# Patient Record
Sex: MALE | Race: WHITE | NOT HISPANIC OR LATINO | Employment: FULL TIME | URBAN - METROPOLITAN AREA
[De-identification: names, ages, dates, MRNs, and addresses within clinical notes are randomized per-mention and may not be internally consistent; named-entity substitution may affect disease eponyms.]

---

## 2019-10-17 ENCOUNTER — ALLIED HEALTH/NURSE VISIT (OUTPATIENT)
Dept: NURSING | Facility: CLINIC | Age: 49
End: 2019-10-17
Payer: COMMERCIAL

## 2019-10-17 DIAGNOSIS — Z23 NEED FOR PROPHYLACTIC VACCINATION AND INOCULATION AGAINST INFLUENZA: Primary | ICD-10-CM

## 2019-10-17 PROCEDURE — 90686 IIV4 VACC NO PRSV 0.5 ML IM: CPT

## 2019-10-17 PROCEDURE — 90471 IMMUNIZATION ADMIN: CPT

## 2020-02-11 ENCOUNTER — OFFICE VISIT (OUTPATIENT)
Dept: FAMILY MEDICINE | Facility: CLINIC | Age: 50
End: 2020-02-11
Payer: COMMERCIAL

## 2020-02-11 VITALS
WEIGHT: 155.2 LBS | HEART RATE: 69 BPM | HEIGHT: 70 IN | BODY MASS INDEX: 22.22 KG/M2 | RESPIRATION RATE: 16 BRPM | TEMPERATURE: 98.3 F | SYSTOLIC BLOOD PRESSURE: 110 MMHG | OXYGEN SATURATION: 97 % | DIASTOLIC BLOOD PRESSURE: 61 MMHG

## 2020-02-11 DIAGNOSIS — Z00.00 WELL ADULT HEALTH CHECK: Primary | ICD-10-CM

## 2020-02-11 DIAGNOSIS — N52.9 ERECTILE DYSFUNCTION, UNSPECIFIED ERECTILE DYSFUNCTION TYPE: ICD-10-CM

## 2020-02-11 PROCEDURE — 99213 OFFICE O/P EST LOW 20 MIN: CPT | Mod: 25 | Performed by: NURSE PRACTITIONER

## 2020-02-11 PROCEDURE — 99396 PREV VISIT EST AGE 40-64: CPT | Performed by: NURSE PRACTITIONER

## 2020-02-11 RX ORDER — SILDENAFIL 25 MG/1
25 TABLET, FILM COATED ORAL DAILY PRN
Qty: 30 TABLET | Refills: 3 | Status: SHIPPED | OUTPATIENT
Start: 2020-02-11 | End: 2021-01-24

## 2020-02-11 ASSESSMENT — ENCOUNTER SYMPTOMS
DIARRHEA: 0
ABDOMINAL PAIN: 0
NERVOUS/ANXIOUS: 0
MYALGIAS: 0
WEAKNESS: 0
HEADACHES: 0
SHORTNESS OF BREATH: 0
CONSTIPATION: 0
NAUSEA: 0
PALPITATIONS: 0
HEARTBURN: 0
PARESTHESIAS: 0
EYE PAIN: 0
ARTHRALGIAS: 0
HEMATOCHEZIA: 0
FREQUENCY: 0
CHILLS: 0
DYSURIA: 0
DIZZINESS: 0
JOINT SWELLING: 0
COUGH: 0
SORE THROAT: 0
HEMATURIA: 0
FEVER: 0

## 2020-02-11 ASSESSMENT — MIFFLIN-ST. JEOR: SCORE: 1579.2

## 2020-02-11 NOTE — PROGRESS NOTES
SUBJECTIVE:   CC: Laurent Lepe is an 49 year old male who presents for preventative health visit.     Healthy Habits:     Getting at least 3 servings of Calcium per day:  NO    Bi-annual eye exam:  Yes    Dental care twice a year:  Yes    Sleep apnea or symptoms of sleep apnea:  None    Diet:  Regular (no restrictions)    Frequency of exercise:  4-5 days/week    Duration of exercise:  30-45 minutes    Taking medications regularly:  Not Applicable    Medication side effects:  Not applicable    PHQ-2 Total Score: 0    Additional concerns today:  No    ED -  Trouble getting erect and maintaining erection  Minimal alcohol  No other drugs  Would like to try medications.    Never taken anything for this in the past.       Today's PHQ-2 Score:   PHQ-2 ( 1999 Pf[employer]) 2/11/2020   Q1: Little interest or pleasure in doing things 0   Q2: Feeling down, depressed or hopeless 0   PHQ-2 Score 0   Q1: Little interest or pleasure in doing things Not at all   Q2: Feeling down, depressed or hopeless Not at all   PHQ-2 Score 0       Abuse: Current or Past(Physical, Sexual or Emotional)- No  Do you feel safe in your environment? Yes        Social History     Tobacco Use     Smoking status: Never Smoker     Smokeless tobacco: Never Used   Substance Use Topics     Alcohol use: Yes     Comment: 2-3 drinks per week     If you drink alcohol do you typically have >3 drinks per day or >7 drinks per week? No    Alcohol Use 2/11/2020   Prescreen: >3 drinks/day or >7 drinks/week? No   Prescreen: >3 drinks/day or >7 drinks/week? -     Last PSA: No results found for: PSA    Reviewed orders with patient. Reviewed health maintenance and updated orders accordingly - Yes    Reviewed and updated as needed this visit by clinical staff  Tobacco  Allergies  Meds  Problems  Med Hx  Surg Hx  Fam Hx  Soc Hx          Reviewed and updated as needed this visit by Provider  Tobacco  Allergies  Meds  Problems  Med Hx  Surg Hx  Fam Hx          Review of  "Systems   Constitutional: Negative for chills and fever.   HENT: Negative for congestion, ear pain, hearing loss and sore throat.    Eyes: Negative for pain and visual disturbance.   Respiratory: Negative for cough and shortness of breath.    Cardiovascular: Negative for chest pain, palpitations and peripheral edema.   Gastrointestinal: Negative for abdominal pain, constipation, diarrhea, heartburn, hematochezia and nausea.   Genitourinary: Positive for impotence. Negative for discharge, dysuria, frequency, hematuria and urgency.   Musculoskeletal: Negative for arthralgias, joint swelling and myalgias.   Skin: Negative for rash.   Neurological: Negative for dizziness, weakness, headaches and paresthesias.   Psychiatric/Behavioral: Negative for mood changes. The patient is not nervous/anxious.        OBJECTIVE:   /61   Pulse 69   Temp 98.3  F (36.8  C)   Resp 16   Ht 1.784 m (5' 10.25\")   Wt 70.4 kg (155 lb 3.2 oz)   SpO2 97%   BMI 22.11 kg/m      Physical Exam  GENERAL: healthy, alert and no distress  EYES: Eyes grossly normal to inspection, PERRL and conjunctivae and sclerae normal  HENT: ear canals and TM's normal, nose and mouth without ulcers or lesions  NECK: no adenopathy, no asymmetry, masses, or scars and thyroid normal to palpation  RESP: lungs clear to auscultation - no rales, rhonchi or wheezes  CV: regular rate and rhythm, normal S1 S2, no S3 or S4, no murmur, click or rub, no peripheral edema and peripheral pulses strong  ABDOMEN: soft, nontender, no hepatosplenomegaly, no masses and bowel sounds normal  MS: no gross musculoskeletal defects noted, no edema  SKIN: no suspicious lesions or rashes  PSYCH: mentation appears normal, affect normal/bright      ASSESSMENT/PLAN:       ICD-10-CM    1. Well adult health check Z00.00    2. Erectile dysfunction, unspecified erectile dysfunction type N52.9 sildenafil (VIAGRA) 25 MG tablet     OFFICE/OUTPT VISIT,EST,LEVL III      well male, not fasting " "for labs.  Encouraged to continue exercise and healthy diet choices.      Discussed the nature and pathophysiology of erectile dysfunction, that this is primarily physiologic and incidence is increased with age and any underlying vascular disease. Went over the use of Viagra and similar drugs for treating this disorder.  Discussed risks, benefits, side effects, and alternatives of therapy. Recheck if therapy is not effective or if side effects preclude its use.    COUNSELING:   Reviewed preventive health counseling, as reflected in patient instructions    Estimated body mass index is 22.11 kg/m  as calculated from the following:    Height as of this encounter: 1.784 m (5' 10.25\").    Weight as of this encounter: 70.4 kg (155 lb 3.2 oz).          reports that he has never smoked. He has never used smokeless tobacco.      Counseling Resources:  ATP IV Guidelines  Pooled Cohorts Equation Calculator  FRAX Risk Assessment  ICSI Preventive Guidelines  Dietary Guidelines for Americans, 2010  USDA's MyPlate  ASA Prophylaxis  Lung CA Screening    MOLLY James CNP  [facility]  "

## 2021-01-23 DIAGNOSIS — N52.9 ERECTILE DYSFUNCTION, UNSPECIFIED ERECTILE DYSFUNCTION TYPE: ICD-10-CM

## 2021-01-24 RX ORDER — SILDENAFIL 25 MG/1
25 TABLET, FILM COATED ORAL DAILY PRN
Qty: 30 TABLET | Refills: 0 | Status: SHIPPED | OUTPATIENT
Start: 2021-01-24 | End: 2021-06-28

## 2021-01-24 NOTE — TELEPHONE ENCOUNTER
---Prescription approved per [facility] Protocol.        Sanam Joyner RN BSN      [facility]          --Last visit:  2/11/2020

## 2021-06-25 DIAGNOSIS — N52.9 ERECTILE DYSFUNCTION, UNSPECIFIED ERECTILE DYSFUNCTION TYPE: ICD-10-CM

## 2021-06-28 RX ORDER — SILDENAFIL 25 MG/1
TABLET, FILM COATED ORAL
Qty: 30 TABLET | Refills: 0 | Status: SHIPPED | OUTPATIENT
Start: 2021-06-28

## 2021-12-22 DIAGNOSIS — N52.9 ERECTILE DYSFUNCTION, UNSPECIFIED ERECTILE DYSFUNCTION TYPE: ICD-10-CM

## 2021-12-23 RX ORDER — SILDENAFIL 25 MG/1
TABLET, FILM COATED ORAL
Qty: 30 TABLET | Refills: 0 | OUTPATIENT
Start: 2021-12-23

## 2021-12-23 NOTE — TELEPHONE ENCOUNTER
Routing refill request to provider for review/approval because:  Patient needs to be seen because it has been more than 1 year since last office visit.    Last seen by EARL Whittington: 2/2020    Team Coordinators: Please contact patient to set up annual physical for any further refills.     JULIENNE MorrisonN RN  [facility]

## 2022-11-14 ENCOUNTER — TELEPHONE (OUTPATIENT)
Dept: CONSULT | Facility: CLINIC | Age: 52
End: 2022-11-14

## 2023-10-21 ENCOUNTER — OFFICE VISIT (OUTPATIENT)
Dept: URGENT CARE | Facility: URGENT CARE | Age: 53
End: 2023-10-21
Payer: COMMERCIAL

## 2023-10-21 VITALS
HEART RATE: 77 BPM | DIASTOLIC BLOOD PRESSURE: 80 MMHG | SYSTOLIC BLOOD PRESSURE: 126 MMHG | WEIGHT: 162 LBS | OXYGEN SATURATION: 99 % | BODY MASS INDEX: 23.19 KG/M2 | HEIGHT: 70 IN

## 2023-10-21 DIAGNOSIS — S61.219A LACERATION OF FINGER OF RIGHT HAND WITHOUT FOREIGN BODY, NAIL DAMAGE STATUS UNSPECIFIED, UNSPECIFIED FINGER, INITIAL ENCOUNTER: ICD-10-CM

## 2023-10-21 DIAGNOSIS — S61.214A LACERATION OF RIGHT RING FINGER WITHOUT DAMAGE TO NAIL, FOREIGN BODY PRESENCE UNSPECIFIED, INITIAL ENCOUNTER: Primary | ICD-10-CM

## 2023-10-21 PROCEDURE — 12002 RPR S/N/AX/GEN/TRNK2.6-7.5CM: CPT | Mod: F7 | Performed by: FAMILY MEDICINE

## 2023-10-21 NOTE — PROGRESS NOTES
Subjective: Patient was working on his mower blade this morning when it slipped and he cut his right middle ring and fifth finger.  Tetanus shot is up-to-date    Objective: There are 3 lacerations, 1 on each finger.  The middle finger is more of a small divot removed but I was able to bring the 2 edges together after local Xylocaine without epinephrine and 1 stitch.  The fifth finger has a C-shaped laceration that I sutured with three 5-0 Ethilon sutures.  The ring finger has a more irregular flap that I was able to pin down, appears to have good viability, with five 5-0 Ethilon sutures.  We put on a pressure bandage.    Assessment and plan: Laceration repair 3 separate fingers, fifth finger about 2 and half centimeters, middle finger about 1 cm, and 4 cm for the ring finger.  Sutures out in 7 or 8 days, keep it clean, minimize flexion.

## 2023-10-29 ENCOUNTER — ANCILLARY PROCEDURE (OUTPATIENT)
Dept: GENERAL RADIOLOGY | Facility: CLINIC | Age: 53
End: 2023-10-29
Attending: PHYSICIAN ASSISTANT
Payer: COMMERCIAL

## 2023-10-29 ENCOUNTER — OFFICE VISIT (OUTPATIENT)
Dept: URGENT CARE | Facility: URGENT CARE | Age: 53
End: 2023-10-29
Payer: COMMERCIAL

## 2023-10-29 VITALS
RESPIRATION RATE: 16 BRPM | HEIGHT: 70 IN | TEMPERATURE: 98 F | BODY MASS INDEX: 23.19 KG/M2 | WEIGHT: 162 LBS | HEART RATE: 60 BPM | SYSTOLIC BLOOD PRESSURE: 124 MMHG | DIASTOLIC BLOOD PRESSURE: 80 MMHG

## 2023-10-29 DIAGNOSIS — M79.89 FINGER SWELLING: ICD-10-CM

## 2023-10-29 DIAGNOSIS — L08.9 FINGER INFECTION: Primary | ICD-10-CM

## 2023-10-29 PROCEDURE — 73140 X-RAY EXAM OF FINGER(S): CPT | Mod: TC | Performed by: RADIOLOGY

## 2023-10-29 PROCEDURE — 99214 OFFICE O/P EST MOD 30 MIN: CPT | Performed by: PHYSICIAN ASSISTANT

## 2023-10-29 NOTE — PROGRESS NOTES
"Chief Complaint   Patient presents with    Urgent Care    Musculoskeletal Problem    Laceration     Here last Saturday for laceration on right 3-5 fingers, here today for removal, having a lot of pain in 4th finger and can't make a fist.       ASSESSMENT/PLAN:  Laurent was seen today for urgent care, musculoskeletal problem and laceration.    Diagnoses and all orders for this visit:    Finger infection  -     amoxicillin-clavulanate (AUGMENTIN) 875-125 MG tablet; Take 1 tablet by mouth 2 times daily for 7 days  -     Orthopedic  Referral; Future    Finger swelling  -     XR Finger Right G/E 2 Views; Future  -     Orthopedic  Referral; Future    Infected lacerations of the pinky and ring finger on the right hand.  Do have some concern for tenosynovitis given the pain with passive extension, whole finger swelling and no evidence of fracture or osteomyelitis.  Because he did bite some tissue off after initially happening I feel it appropriate to cover for oral pathogens and Augmentin should be a good choice.  I would like him to be closely followed by hand specialty to make sure he is improving and to further evaluate for tenosynovitis.  Referral placed  Sutures were removed.    Gonzalo Dubois PA-C      SUBJECTIVE:  Laurent is a 53 year old male who presents to urgent care for suture removal after he had lacerations to his pinky, ring and middle finger 8 days ago.  They were placed here.  Since then he has still had some pain and swelling in the areas.  No discharge.  He does state that after it initially happened he did bite some of the skin off of the wound before coming in to be seen.    ROS: Pertinent ROS neg other than the symptoms noted above in the HPI.     OBJECTIVE:  /80   Pulse 60   Temp 98  F (36.7  C) (Temporal)   Resp 16   Ht 1.778 m (5' 10\")   Wt 73.5 kg (162 lb)   BMI 23.24 kg/m     GENERAL: healthy, alert and no distress  MS: See below  SKIN: Laceration over the IP joint of the " pinky, small amount of dehiscence and pus expressed with flexion, tender.  Full range of motion.  Ring finger: Swelling of the entire finger, keeps in slight flexion, pain with extension and flexion passively.  Diffuse tenderness, healing wound with some macerated tissue, middle finger well-healing scab without any erythema or tenderness    DIAGNOSTICS  Xray - Reviewed and interpreted by me.  No acute bony abnormality or fracture, no indications of osteomyelitis  No results found for any visits on 10/29/23.     Current Outpatient Medications   Medication    sildenafil (VIAGRA) 25 MG tablet     No current facility-administered medications for this visit.      Patient Active Problem List   Diagnosis    CARDIOVASCULAR SCREENING; LDL GOAL LESS THAN 160    Male pattern alopecia      Past Medical History:   Diagnosis Date    Allergic rhinitis, cause unspecified     Allergic rhinitis     Past Surgical History:   Procedure Laterality Date    NO HISTORY OF SURGERY       Family History   Problem Relation Age of Onset    C.A.D. Maternal Grandfather     Hypertension Maternal Grandfather     Asthma Paternal Grandfather     Hypertension Paternal Grandfather     Diabetes No family hx of     Cerebrovascular Disease No family hx of     Cancer - colorectal No family hx of     Prostate Cancer No family hx of      Social History     Tobacco Use    Smoking status: Never    Smokeless tobacco: Never   Substance Use Topics    Alcohol use: Yes     Comment: 2-3 drinks per week              The plan of care was discussed with the patient. They understand and agree with the course of treatment prescribed. A printed summary was given including instructions and medications.  The use of Dragon[employer] dictation services may have been used to construct the content in this note; any grammatical or spelling errors are non-intentional. Please contact the author of this note directly if you are in need of any clarification.

## 2023-10-31 ENCOUNTER — TELEPHONE (OUTPATIENT)
Dept: ORTHOPEDICS | Facility: CLINIC | Age: 53
End: 2023-10-31
Payer: COMMERCIAL

## 2023-10-31 NOTE — TELEPHONE ENCOUNTER
DIAGNOSIS:   Finger infection [L08.9]  - Primary  Finger swelling [M79.89]   APPOINTMENT DATE: 11/01/2023   NOTES STATUS DETAILS   OFFICE NOTE from referring provider Internal 10/29/2023 - [facility]   OFFICE NOTE from other specialist Internal 10/21/2023 - [facility]   MEDICATION LIST Internal    XRAYS (IMAGES & REPORTS) Internal 10/29/2023 - RT Finger

## 2023-10-31 NOTE — TELEPHONE ENCOUNTER
Called and talked to patient about his appointment with Dr. Watson tomorrow on 11/1/2023. Patient was seen in urgent care on 10/21 for a hand laceration and had sutures placed. Sutures were removed on 10/29 in urgent care and a referral was placed for orthopedic  and he was told to follow up by hand specialty.     After discussing with Dr. Watson this was determined not appropriate to be seen by him in case the laceration needs cleaned out surgically. I explained this to the patient and he was frustrated that this was not caught when he was being scheduled I explained that our scheduling team can only view some things and others may be off limits for them.    I went through Dr. Dave's schedule with the patient to get him in with that provider, however due to Dr. Dave's schedule being too far out the patient needs to be sooner than he can provide. I provided the scheduling number and told him to give it a try to see if there is another hand specialist that I am not aware of or to follow up with his PCP. Patient was appreciative of the time given to try and figure this out and will contact those resources. I will be cancelling his appointment with Dr. Watson on 11/1/2023.    Amanda Hdez, ATC, LAT

## 2023-11-01 ENCOUNTER — OFFICE VISIT (OUTPATIENT)
Dept: ORTHOPEDICS | Facility: CLINIC | Age: 53
End: 2023-11-01
Attending: PHYSICIAN ASSISTANT
Payer: COMMERCIAL

## 2023-11-01 ENCOUNTER — PRE VISIT (OUTPATIENT)
Dept: ORTHOPEDICS | Facility: CLINIC | Age: 53
End: 2023-11-01

## 2023-11-01 DIAGNOSIS — M79.89 FINGER SWELLING: ICD-10-CM

## 2023-11-01 DIAGNOSIS — L08.9 FINGER INFECTION: ICD-10-CM

## 2023-11-01 PROCEDURE — 99203 OFFICE O/P NEW LOW 30 MIN: CPT | Mod: GC | Performed by: ORTHOPAEDIC SURGERY

## 2023-11-01 NOTE — NURSING NOTE
Reason For Visit:   Chief Complaint   Patient presents with    Consult     Right Middle, Ring and Small finger lacerations and possible infection DOI: 10/21/23       Primary MD: Viet Adrian  Ref. MD: ED    Age: 53 year old    ?  No      There were no vitals taken for this visit.      Pain Assessment  Patient Currently in Pain: Yes  0-10 Pain Scale: 3  Primary Pain Location: Finger (Comment which one) (Right ring)  Pain Descriptors: Intermittent, Discomfort    Hand Dominance Evaluation  Hand Dominance: Right          QuickDASH Assessment      11/1/2023    10:23 AM   QuickDASH Main   1. Open a tight or new jar Mild difficulty   2. Do heavy household chores (e.g., wash walls, floors) Mild difficulty   3. Carry a shopping bag or briefcase Mild difficulty   4. Wash your back Mild difficulty   5. Use a knife to cut food Mild difficulty   6. Recreational activities in which you take some force or impact through your arm, shoulder or hand (e.g., golf, hammering, tennis, etc.) Mild difficulty   7. During the past week, to what extent has your arm, shoulder or hand problem interfered with your normal social activities with family, friends, neighbours or groups Moderately   8. During the past week, were you limited in your work or other regular daily activities as a result of your arm, shoulder or hand problem Moderately limited   9. Arm, shoulder or hand pain Moderate   10.Tingling (pins and needles) in your arm,shoulder or hand None   11. During the past week, how much difficulty have you had sleeping because of the pain in your arm, shoulder or hand No difficulty   Quickdash Ability Score 27.27          Current Outpatient Medications   Medication Sig Dispense Refill    amoxicillin-clavulanate (AUGMENTIN) 875-125 MG tablet Take 1 tablet by mouth 2 times daily for 7 days 14 tablet 0    sildenafil (VIAGRA) 25 MG tablet TAKE ONE TABLET BY MOUTH ONCE DAILY AS NEEDED FOR ERECTILE DYSFUNCTION (Patient not taking:  Reported on 10/21/2023) 30 tablet 0       No Known Allergies    KHADAR NAVA, ATC

## 2023-11-01 NOTE — PROGRESS NOTES
Hand clinic note    Sung is a 53-year-old gentleman presented today in follow-up from his primary care.  The patient was changing a blade in his lawnmower when he slipped and cut the dorsal side of his long ring and small fingers.  He had a large laceration over the ring finger with a hanging tag of skin which he then bit off with his teeth.  He was seen and underwent bedside irrigation and closure of the wounds. When the wounds became red with concern for infection he was started on augmentin which he has not yet completed.  The redness about the wound has improved significantly since starting on the Augmentin.  He has difficulty with range of motion of the fingers.    On examination of the right hand there are healing wounds over the long ring and small finger PIP joints.  The small finger wound appears to be fairly superficial and is almost completely healed, the long finger wound is slightly larger but is nearly completely healed.  The largest wound is over the ring finger PIP joint.  This is the site where the concern for infection was and where he bit off some tissue. While the finger remains slightly more swollen than the others, the erythema seems to be significantly improved.  There is also some fine skin wrinkling indicating decreased swelling.  The patient endorses that this looks a little bit better.  He has extremely limited motion at the PIP joints of all 3 fingers worse in the ring finger, due to both stiffness and pain.  Short arc range of motion in the PIP joint of the ring finger is not painful for the patient.  It is not particularly tender to palpation around this area.  He appears to be able to fire FDP as well as fire extension across the PIP joint indicating that the central slip is still intact.  There is some very mild boutonniere deformity starting in the ring finger.    Previous radiographs from 10/29 reviewed today and show no associated bony injury.    53-year-old male with lacerations  to the dorsal fingers long ring and small on the right hand.  With what appears to be resolving superficial infection currently on antibiotics.  -At this point it does not seem that he has concerning signs for intra-articular infection in the PIP joint of the ring finger.  We do recommend that he continue the course of antibiotics and see hand therapy to work on range of motion.  If his infection resolves with just this course of antibiotics alone stiffness is going to be a difficult problem for him and he is understanding of this.  We did discuss that after he stops the antibiotic if his finger starts to swell or begins draining again that he needs to be seen immediately to get restarted on antibiotics and will likely need irrigate and irrigation and debridement at that time.  Patient was understanding of this.  We will have him follow-up in 2 weeks for repeat wound check.    Patient seen and discussed with Dr. Angel Grove MD  Orthopaedic Surgery PGY-4

## 2023-11-01 NOTE — LETTER
11/1/2023         RE: Laurent Lepe  [address]  [place] [address]        Dear Colleague,    Thank you for referring your patient, Laurent Lepe, to the [facility] [place]. Please see a copy of my visit note below.    Hand clinic note    Sung is a 53-year-old gentleman presented today in follow-up from his primary care.  The patient was changing a blade in his lawnmower when he slipped and cut the dorsal side of his long ring and small fingers.  He had a large laceration over the ring finger with a hanging tag of skin which he then bit off with his teeth.  He was seen and underwent bedside irrigation and closure of the wounds. When the wounds became red with concern for infection he was started on augmentin which he has not yet completed.  The redness about the wound has improved significantly since starting on the Augmentin.  He has difficulty with range of motion of the fingers.    On examination of the right hand there are healing wounds over the long ring and small finger PIP joints.  The small finger wound appears to be fairly superficial and is almost completely healed, the long finger wound is slightly larger but is nearly completely healed.  The largest wound is over the ring finger PIP joint.  This is the site where the concern for infection was and where he bit off some tissue. While the finger remains slightly more swollen than the others, the erythema seems to be significantly improved.  There is also some fine skin wrinkling indicating decreased swelling.  The patient endorses that this looks a little bit better.  He has extremely limited motion at the PIP joints of all 3 fingers worse in the ring finger, due to both stiffness and pain.  Short arc range of motion in the PIP joint of the ring finger is not painful for the patient.  It is not particularly tender to palpation around this area.  He appears to be able to fire FDP as well as fire extension across the PIP  joint indicating that the central slip is still intact.  There is some very mild boutonniere deformity starting in the ring finger.    Previous radiographs from 10/29 reviewed today and show no associated bony injury.    53-year-old male with lacerations to the dorsal fingers long ring and small on the right hand.  With what appears to be resolving superficial infection currently on antibiotics.  -At this point it does not seem that he has concerning signs for intra-articular infection in the PIP joint of the ring finger.  We do recommend that he continue the course of antibiotics and see hand therapy to work on range of motion.  If his infection resolves with just this course of antibiotics alone stiffness is going to be a difficult problem for him and he is understanding of this.  We did discuss that after he stops the antibiotic if his finger starts to swell or begins draining again that he needs to be seen immediately to get restarted on antibiotics and will likely need irrigate and irrigation and debridement at that time.  Patient was understanding of this.  We will have him follow-up in 2 weeks for repeat wound check.    Patient seen and discussed with Dr. Angel Grove MD  Orthopaedic Surgery PGY-4        Ant Ortiz MD

## 2023-11-03 ENCOUNTER — THERAPY VISIT (OUTPATIENT)
Dept: OCCUPATIONAL THERAPY | Facility: CLINIC | Age: 53
End: 2023-11-03
Payer: COMMERCIAL

## 2023-11-03 DIAGNOSIS — L08.9 FINGER INFECTION: ICD-10-CM

## 2023-11-03 DIAGNOSIS — M79.641 PAIN OF RIGHT HAND: Primary | ICD-10-CM

## 2023-11-03 DIAGNOSIS — M79.89 FINGER SWELLING: ICD-10-CM

## 2023-11-03 PROCEDURE — 97165 OT EVAL LOW COMPLEX 30 MIN: CPT | Mod: GO | Performed by: OCCUPATIONAL THERAPIST

## 2023-11-03 PROCEDURE — 97535 SELF CARE MNGMENT TRAINING: CPT | Mod: GO | Performed by: OCCUPATIONAL THERAPIST

## 2023-11-03 PROCEDURE — 97110 THERAPEUTIC EXERCISES: CPT | Mod: GO | Performed by: OCCUPATIONAL THERAPIST

## 2023-11-03 NOTE — PROGRESS NOTES
OCCUPATIONAL THERAPY EVALUATION  Type of Visit: Evaluation    See electronic medical record for Abuse and Falls Screening details.    Subjective      Presenting condition or subjective complaint: finger laceration with mild infection  Date of onset: 10/21/759367/21/23    Relevant medical history:     Past Medical History:   Diagnosis Date    Allergic rhinitis, cause unspecified     Allergic rhinitis      Dates & types of surgery: na    Prior diagnostic imaging/testing results: X-ray     Prior therapy history for the same diagnosis, illness or injury: No      Prior Level of Function  Transfers: Independent  Ambulation: Independent  ADL: Independent  IADL:  IND    Living Environment  Social support: With a significant other or spouse   Type of home: House; 2-story   Stairs to enter the home: Yes       Ramp: No   Stairs inside the home: Yes 2 Is there a railing: Yes   Help at home: None    Employment:     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  Hobbies/Interests:  running, lifting at gym    Patient goals for therapy: type, back to normal Full ROM no pain decreased swelling    Pain assessment: Pain present     Objective   ADDITIONAL HISTORY:  Right hand dominant  Patient reports symptoms of pain, stiffness/loss of motion, weakness/loss of strength, and edema  Transportation: drives  Currently working in normal job without restrictions    Functional Outcome Measure:   Upper Extremity Functional Index Score:  SCORE:   Column Totals: /80: 66   (A lower score indicates greater disability.)       PAIN:  Pain Level at Rest: 0/10  Pain Level with Use: 4/10  Pain Location: ring finger and at PIP joint reports pain on lateral sides  Pain Quality: Dull and Sharp  Pain Frequency: intermittent  Pain is Worst: daytime or nighttime  Pain is Exacerbated By: with use and if RF is pushed laterally  Pain is Relieved By: none  Pain Progression: Improved    EDEMA:   Finger/Thumb   (Circumference measured in cm) 11/3/2023  LEFT  11/3/2023  RIGHT   Long P1 6.1 6.5   PIP 6.3 6.5   P2 5.4 5.8   Ring P1 6.5 7.0   PIP 5.7 7.4   P2 5.0 5.8   Small P1 5.5 6.3   PIP 5.0 6.3   P2 4.4 5.0      SCAR/WOUND: Wound/Scar Signs: tender, inflammed, non-adhered, and clean without drainage    SENSATION: WNL throughout all nerve distributions; per patient report     ROM:   Hand ROM  Left AROM Right AROM    Index MP ALL WFL 0/90 -10/78   PIP  0/90   DIP  0/70   Total Active Motion     Long MP  0/83   PIP  0/60   DIP  0/60   Total Active Motion     RING MP  0/78   PIP  -25/50   DIP  0/5   Total Active Motion     Small MP  0/85   PIP  /70   DIP  0/15   Total Active Motion         RESISTED TESTING: Not tested due to pain    STRENGTH:     Measured in pounds 11/3/2023 11/3/2023    Left Right   Trial 1 75 33     Lateral Pinch  Measured in pounds 11/3/2023 11/3/2023    Left Right   Trial 1 19 19     3 Point Pinch  Measured in pounds 11/3/2023 11/3/2023    Left Right   Trial 1 19 15     PALPATION:   Finger Palpation MF RF SF   CMC Joint - - -   A1 Pulley - - -   A3 Pulley/PIP Joint - + On Lateral sides of PIP joint -         Assessment & Plan   CLINICAL IMPRESSIONS  Medical Diagnosis: R Finger infection and laceration    Treatment Diagnosis: R RF SF pain    Impression/Assessment: Pt is a 53 year old male presenting to Occupational Therapy due to R finger impaired ROM.  The following significant findings have been identified: Impaired ROM, Impaired strength, and Pain.  These identified deficits interfere with their ability to perform self care tasks, work tasks,  yard work, and meal planning and preparation as compared to previous level of function.     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  Clinical Decision Making (Complexity): Low complexity    PLAN OF CARE  Treatment  Interventions:  Modalities:  US, Paraffin, TENS, and E-Stim  Therapeutic Exercise:  AROM, AAROM, PROM, Tendon Gliding, Blocking, Reverse Blocking, Place and Hold, Contract Relax, Extensor Tracking, Isotonics, Isometrics, and Stabilization  Neuromuscular re-education:  Nerve Gliding, Coordination/Dexterity, Sensory re-education, Desensitization, Proprioceptive Training, Kinesiotaping, Isometrics, and Stabilization  Manual Techniques:  Coordination/Dexterity, Joint mobilization, Scar mobilization, Friction massage, Myofascial release, and Manual edema mobilization  Orthotic Fabrication:  Static and Static progressive  Self Care:  Self Care Tasks, Ergonomic Considerations, and Work Tasks    Long Term Goals   OT Goal 1  Goal Identifier: Functional Carry  Goal Description: Pt will complete R hand jakob with 1/10 pain using all digitis fo 5lbs.  Rationale: In order to maximize safety and independence with performance of self-care activities  Goal Progress: Initial  Target Date: 01/03/24      Frequency of Treatment: 1 x a week  Duration of Treatment: 8 weeks     Recommended Referrals to Other Professionals:   Education Assessment: Learner/Method: Patient;Demonstration;Pictures/Video;No Barriers to Learning     Risks and benefits of evaluation/treatment have been explained.   Patient/Family/caregiver agrees with Plan of Care.     Evaluation Time:    OT Eval, Low Complexity Minutes (62209): 28       Signing Clinician: MARI Ivy

## 2023-11-10 ENCOUNTER — THERAPY VISIT (OUTPATIENT)
Dept: OCCUPATIONAL THERAPY | Facility: CLINIC | Age: 53
End: 2023-11-10
Payer: COMMERCIAL

## 2023-11-10 DIAGNOSIS — M79.641 PAIN OF RIGHT HAND: Primary | ICD-10-CM

## 2023-11-10 PROCEDURE — 97110 THERAPEUTIC EXERCISES: CPT | Mod: GO | Performed by: OCCUPATIONAL THERAPIST

## 2023-11-15 ENCOUNTER — OFFICE VISIT (OUTPATIENT)
Dept: ORTHOPEDICS | Facility: CLINIC | Age: 53
End: 2023-11-15
Payer: COMMERCIAL

## 2023-11-15 DIAGNOSIS — L08.9 FINGER INFECTION: Primary | ICD-10-CM

## 2023-11-15 PROCEDURE — 99213 OFFICE O/P EST LOW 20 MIN: CPT | Performed by: ORTHOPAEDIC SURGERY

## 2023-11-15 NOTE — LETTER
11/15/2023         RE: Laurent Lepe  [address]  [place] [address]        Dear Colleague,    Thank you for referring your patient, Laurent Lepe, to the [facility] [place]. Please see a copy of my visit note below.    Jack is here for follow-up of his right ring finger and small finger lacerations.  Small finger is doing really well, the ring finger is also improving but still some pain.  He has been off antibiotics for a week and he still he is improving still.  No fevers, no chills, no pain noted down into the palm.  He is working with therapy.    The past medical history was reviewed and documented in the chart.  This includes medications, surgeries, social history as well as review of systems.    Physical examination of the right hand demonstrates nicely healed lacerations now.  No signs of any infection.  Minimal tenderness around the right ring finger PIP joint.  No evidence of any flexor tenosynovitis.  FDS and FDP are functional as are the extensors.  Slight flexion contracture at the PIP joint of the ring finger and slight hyperextension of the DIP joint consistent with a very mild boutonniere deformity.  Small finger has basically full range of motion now.  Bilaterally, no motor, no sensory deficits are noted.  No significant atrophy.  There is brisk capillary refill in all digits and a palpable radial pulse.  No overlying skin changes noted.      Impression: Status post lacerations right ring, small finger, superficial infection, mild boutonniere right ring finger    Plan: Jack and I reviewed exercises.  He needs to work on flexion at the PIP joint of the ring finger especially.  It important for him to continue therapy working on extension at the PIP joint and flexion at the DIP joint.  He can resume activities as tolerated.  No signs of any ongoing infection currently.  We will see him back in 4 to 6 weeks or as needed.    Ant Ortiz MD

## 2023-11-15 NOTE — PROGRESS NOTES
Jack is here for follow-up of his right ring finger and small finger lacerations.  Small finger is doing really well, the ring finger is also improving but still some pain.  He has been off antibiotics for a week and he still he is improving still.  No fevers, no chills, no pain noted down into the palm.  He is working with therapy.    The past medical history was reviewed and documented in the chart.  This includes medications, surgeries, social history as well as review of systems.    Physical examination of the right hand demonstrates nicely healed lacerations now.  No signs of any infection.  Minimal tenderness around the right ring finger PIP joint.  No evidence of any flexor tenosynovitis.  FDS and FDP are functional as are the extensors.  Slight flexion contracture at the PIP joint of the ring finger and slight hyperextension of the DIP joint consistent with a very mild boutonniere deformity.  Small finger has basically full range of motion now.  Bilaterally, no motor, no sensory deficits are noted.  No significant atrophy.  There is brisk capillary refill in all digits and a palpable radial pulse.  No overlying skin changes noted.      Impression: Status post lacerations right ring, small finger, superficial infection, mild boutonniere right ring finger    Plan: Jack and I reviewed exercises.  He needs to work on flexion at the PIP joint of the ring finger especially.  It important for him to continue therapy working on extension at the PIP joint and flexion at the DIP joint.  He can resume activities as tolerated.  No signs of any ongoing infection currently.  We will see him back in 4 to 6 weeks or as needed.

## 2023-11-15 NOTE — NURSING NOTE
Reason For Visit:   Chief Complaint   Patient presents with    RECHECK     Right Middle, Ring and Small finger lacerations and possible infection DOI: 10/21/23       Primary MD: Viet Adrian  Ref. MD: John    Age: 53 year old    ?  No      There were no vitals taken for this visit.      Pain Assessment  Patient Currently in Pain: Yes  0-10 Pain Scale: 2  Primary Pain Location: Finger (Comment which one) (Right ring  fingers)  Pain Descriptors: Intermittent    Hand Dominance Evaluation  Hand Dominance: Right          QuickDASH Assessment      11/15/2023     7:55 AM   QuickDASH Main   1. Open a tight or new jar Mild difficulty   2. Do heavy household chores (e.g., wash walls, floors) Mild difficulty   3. Carry a shopping bag or briefcase Mild difficulty   4. Wash your back Mild difficulty   5. Use a knife to cut food Mild difficulty   6. Recreational activities in which you take some force or impact through your arm, shoulder or hand (e.g., golf, hammering, tennis, etc.) Moderate difficulty   7. During the past week, to what extent has your arm, shoulder or hand problem interfered with your normal social activities with family, friends, neighbours or groups Slightly   8. During the past week, were you limited in your work or other regular daily activities as a result of your arm, shoulder or hand problem Slightly limited   9. Arm, shoulder or hand pain Mild   10.Tingling (pins and needles) in your arm,shoulder or hand None   11. During the past week, how much difficulty have you had sleeping because of the pain in your arm, shoulder or hand No difficulty   Quickdash Ability Score 22.73          Current Outpatient Medications   Medication Sig Dispense Refill    sildenafil (VIAGRA) 25 MG tablet TAKE ONE TABLET BY MOUTH ONCE DAILY AS NEEDED FOR ERECTILE DYSFUNCTION (Patient not taking: Reported on 10/21/2023) 30 tablet 0       No Known Allergies    KHADAR NAVA, ATC

## 2023-11-17 ENCOUNTER — THERAPY VISIT (OUTPATIENT)
Dept: OCCUPATIONAL THERAPY | Facility: CLINIC | Age: 53
End: 2023-11-17
Payer: COMMERCIAL

## 2023-11-17 DIAGNOSIS — L08.9 FINGER INFECTION: ICD-10-CM

## 2023-11-17 PROCEDURE — 97760 ORTHOTIC MGMT&TRAING 1ST ENC: CPT | Mod: GO | Performed by: OCCUPATIONAL THERAPIST

## 2023-11-17 PROCEDURE — 97110 THERAPEUTIC EXERCISES: CPT | Mod: GO | Performed by: OCCUPATIONAL THERAPIST

## 2023-12-20 ENCOUNTER — OFFICE VISIT (OUTPATIENT)
Dept: ORTHOPEDICS | Facility: CLINIC | Age: 53
End: 2023-12-20
Payer: COMMERCIAL

## 2023-12-20 DIAGNOSIS — L08.9 FINGER INFECTION: Primary | ICD-10-CM

## 2023-12-20 PROCEDURE — 99212 OFFICE O/P EST SF 10 MIN: CPT | Performed by: ORTHOPAEDIC SURGERY

## 2023-12-20 NOTE — LETTER
12/20/2023         RE: Laurent Lepe  [address]  [place] [address]        Dear Colleague,    Thank you for referring your patient, Laurent Lepe, to the [facility] [place]. Please see a copy of my visit note below.    Sung is here for follow-up of his right hand.  He is doing very well.  He is made nice progress, doing his home exercise program.  Minimal pain at this point, no fevers or chills.  No drainage.  Swelling is decreasing progressively.  Not wearing the splint much at all anymore.    The past medical history was reviewed and documented in the chart.  This includes medications, surgeries, social history as well as review of systems.    Examination of the right hand, ring finger and small finger demonstrates nicely healed lacerations.  No signs of infection.  Ring finger PIP joint is still slightly swollen but motion has improved appreciably and he can make nearly a full fist at this point.  FDS and FDP and extensors are all 5 out of 5.  Minimal boutonniere deformity now.  Bilaterally, no motor, no sensory deficits are noted.  No significant atrophy.  There is brisk capillary refill in all digits and a palpable radial pulse.  No overlying skin changes noted.    Impression: Status post lacerations right ring, small finger, superficial infection (resolved), mild improvement boutonniere right ring finger    Plan: Overall he has improved nicely.  Certainly no surgical indications.  We reviewed his home exercise program.  He can work on edema control, motion and strengthening.  I do not think we need to see him back unless he is having further problems.  He does have our contact information should the need arise though.          Ant Ortiz MD

## 2023-12-20 NOTE — PROGRESS NOTES
Sung is here for follow-up of his right hand.  He is doing very well.  He is made nice progress, doing his home exercise program.  Minimal pain at this point, no fevers or chills.  No drainage.  Swelling is decreasing progressively.  Not wearing the splint much at all anymore.    The past medical history was reviewed and documented in the chart.  This includes medications, surgeries, social history as well as review of systems.    Examination of the right hand, ring finger and small finger demonstrates nicely healed lacerations.  No signs of infection.  Ring finger PIP joint is still slightly swollen but motion has improved appreciably and he can make nearly a full fist at this point.  FDS and FDP and extensors are all 5 out of 5.  Minimal boutonniere deformity now.  Bilaterally, no motor, no sensory deficits are noted.  No significant atrophy.  There is brisk capillary refill in all digits and a palpable radial pulse.  No overlying skin changes noted.    Impression: Status post lacerations right ring, small finger, superficial infection (resolved), mild improvement boutonniere right ring finger    Plan: Overall he has improved nicely.  Certainly no surgical indications.  We reviewed his home exercise program.  He can work on edema control, motion and strengthening.  I do not think we need to see him back unless he is having further problems.  He does have our contact information should the need arise though.

## 2023-12-20 NOTE — NURSING NOTE
Reason For Visit:   Chief Complaint   Patient presents with    RECHECK     Right Middle, Ring and Small finger lacerations and possible infection DOI: 10/21/23       Primary MD: VANESSA MICHELLE  Ref. MD: John    Age: 53 year old    ?  No      There were no vitals taken for this visit.      Pain Assessment  Patient Currently in Pain: No (patient denies any pain in fingers of right hand)    Hand Dominance Evaluation  Hand Dominance: Right          QuickDASH Assessment      12/20/2023     7:59 AM   QuickDASH Main   1. Open a tight or new jar Mild difficulty   2. Do heavy household chores (e.g., wash walls, floors) Mild difficulty   3. Carry a shopping bag or briefcase Mild difficulty   4. Wash your back No difficulty   5. Use a knife to cut food Mild difficulty   6. Recreational activities in which you take some force or impact through your arm, shoulder or hand (e.g., golf, hammering, tennis, etc.) Moderate difficulty   7. During the past week, to what extent has your arm, shoulder or hand problem interfered with your normal social activities with family, friends, neighbours or groups Not at all   8. During the past week, were you limited in your work or other regular daily activities as a result of your arm, shoulder or hand problem Slightly limited   9. Arm, shoulder or hand pain None   10.Tingling (pins and needles) in your arm,shoulder or hand None   11. During the past week, how much difficulty have you had sleeping because of the pain in your arm, shoulder or hand No difficulty   Quickdash Ability Score 15.91          Current Outpatient Medications   Medication Sig Dispense Refill    sildenafil (VIAGRA) 25 MG tablet TAKE ONE TABLET BY MOUTH ONCE DAILY AS NEEDED FOR ERECTILE DYSFUNCTION (Patient not taking: Reported on 10/21/2023) 30 tablet 0       No Known Allergies    KHADAR NAVA, ATC

## 2024-01-28 ENCOUNTER — HEALTH MAINTENANCE LETTER (OUTPATIENT)
Age: 54
End: 2024-01-28

## 2025-02-01 ENCOUNTER — HEALTH MAINTENANCE LETTER (OUTPATIENT)
Age: 55
End: 2025-02-01